# Patient Record
(demographics unavailable — no encounter records)

---

## 2024-11-11 NOTE — HISTORY OF PRESENT ILLNESS
[de-identified] : 37y/o male who came in complaining of diminished hearing and ringing in the left ear that has been going on for 2 years. He denies any ear pain, drainage, recurrent ear infections, ear trauma or ear surgery. He has no nasal or throat complaints

## 2024-11-11 NOTE — ASSESSMENT
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

## 2024-11-11 NOTE — HISTORY OF PRESENT ILLNESS
[de-identified] : 37y/o male who came in complaining of diminished hearing and ringing in the left ear that has been going on for 2 years. He denies any ear pain, drainage, recurrent ear infections, ear trauma or ear surgery. He has no nasal or throat complaints

## 2024-11-11 NOTE — ASSESSMENT
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